# Patient Record
Sex: MALE | Race: WHITE | URBAN - METROPOLITAN AREA
[De-identification: names, ages, dates, MRNs, and addresses within clinical notes are randomized per-mention and may not be internally consistent; named-entity substitution may affect disease eponyms.]

---

## 2017-02-08 DIAGNOSIS — Z09 S/P ORTHOPEDIC SURGERY, FOLLOW-UP EXAM: Primary | ICD-10-CM

## 2017-02-16 ENCOUNTER — OFFICE VISIT (OUTPATIENT)
Dept: ORTHOPEDICS | Facility: CLINIC | Age: 22
End: 2017-02-16

## 2017-02-16 DIAGNOSIS — M89.8X6 PAIN IN LEFT TIBIA: Primary | ICD-10-CM

## 2017-02-16 NOTE — MR AVS SNAPSHOT
After Visit Summary   2017    Manuel Valdovinos    MRN: [id number]           Patient Information     Date Of Birth          1995        Visit Information        Provider Department      2017 10:15 AM Jigna Gorman, APRN CNP [facility]        Today's Diagnoses     Pain in left tibia    -  1       Follow-ups after your visit        Who to contact     Please call your clinic at [phone] to:    Ask questions about your health    Make or cancel appointments    Discuss your medicines    Learn about your test results    Speak to your doctor   If you have compliments or concerns about an experience at your clinic, or if you wish to file a complaint, please contact [facility] Patient Relations at [phone] or email us at [email or web address]         Additional Information About Your Visit        MyChart Information     ZenCard is an electronic gateway that provides easy, online access to your medical records. With ZenCard, you can request a clinic appointment, read your test results, renew a prescription or communicate with your care team.     To sign up for [email or web address] visit the website at [email or web address]   You will be asked to enter the access code listed below, as well as some personal information. Please follow the directions to create your username and password.     Your access code is: [email or web address]  Expires: 2017  6:30 AM     Your access code will  in 90 days. If you need help or a new code, please contact your [facility] or call [phone] for assistance.        Care EveryWhere ID     This is your Care EveryWhere ID. This could be used by other organizations to access your [facility] medical records  [id number]         Blood Pressure from Last 3 Encounters:   16 130/69    Weight from Last 3 Encounters:   16 103.7 kg (228 lb 9.9 oz)   10/23/15 102.1 kg (225 lb)   06/23/15  98.5 kg (217 lb 3.2 oz)              Today, you had the following     No orders found for display       Primary Care Provider Office Phone # Fax #    Kody Hunt [phone]       [facility] [address] [facility] [address]        Thank you!     Thank you for choosing [facility]  for your care. Our goal is always to provide you with excellent care. Hearing back from our patients is one way we can continue to improve our services. Please take a few minutes to complete the written survey that you may receive in the mail after your visit with us. Thank you!             Your Updated Medication List - Protect others around you: Learn how to safely use, store and throw away your medicines at www.disposemymeds.org.          This list is accurate as of: 2/16/17 11:59 PM.  Always use your most recent med list.                   Brand Name Dispense Instructions for use    acetaminophen 325 MG tablet    TYLENOL    100 tablet    Take 3 tablets (975 mg) by mouth every 8 hours       Multi-vitamin Tabs tablet      Take 1 tablet by mouth daily

## 2017-02-16 NOTE — PROGRESS NOTES
"HISTORY OF PRESENT ILLNESS:  Manuel is seen 13 months  of his left tibia osteotomy with repositioning external rotation osteotomy with intramedullary rodding.  Date of surgery 01/14/2016.  He reports that he continues to do well except for with long distance running.  He reports anterior knee pain and occasional pain at the osteotomy site with a running.  Denies locking, catching or swelling of the knee joint, just anterior pain.  This does resolve with occasional over-the-counter NSAIDs.  Otherwise, he continues to be pleased with the position and reports that this surgery \"was a huge success\".  He denies no injuries, falls, twists or trauma.  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  He denies pain at night.  No fevers or chills.      PAST MEDICAL HISTORY:  Reviewed.      REVIEW OF SYSTEMS:  Ten-point review of systems reviewed.      PHYSICAL EXAMINATION:  This is a pleasant, cooperative male, alert and oriented x3, pleasant with the exam neutral alignment upon exam.  Tenderness over the anterior knee.  Motor exam is intact.  Gait pattern is excellent.  He has no joint swelling or pain around his knee joint.  He is nontender to palpate the medial and lateral compartments with full range of motion.  Incisions are healed, benign and intact.  He is nontender to palpate the tibial osteotomy site.      IMAGING:  Two-view x-rays were taken which shows abundant healing through the osteotomy site with good position of the intramedullary patito compared to previous x-rays.      DIAGNOSIS:  Healed left tibia osteotomy with intramedullary patito, status post external rotation deformity correction.      PLAN:  At this time, the natural progression and plan of care was discussed.  I did again continue to recommend hardware removal when he is ready to do so.  Postoperative expectations were discussed.  I did report that this should help resolve his anterior knee pain.  If he continues to have pain at the " osteotomy site, we will give another 4-6 months of healing time.  He will go ahead and schedule a same day hardware removal with a followup visit 2-3 weeks prior for x-ray of his tibia.  Paperwork was filled out for restrictions which would include a long distance running.

## 2017-02-16 NOTE — LETTER
"2/16/2017       RE: Manuel Valdovinos  [address] [facility] [address] [address]     Dear Colleague,    Thank you for referring your patient, Manuel Valdovinos, to the [facility] at [facility]. Please see a copy of my visit note below.    HISTORY OF PRESENT ILLNESS:  Manuel is seen 13 months  of his left tibia osteotomy with repositioning external rotation osteotomy with intramedullary rodding.  Date of surgery 01/14/2016.  He reports that he continues to do well except for with long distance running.  He reports anterior knee pain and occasional pain at the osteotomy site with a running.  Denies locking, catching or swelling of the knee joint, just anterior pain.  This does resolve with occasional over-the-counter NSAIDs.  Otherwise, he continues to be pleased with the position and reports that this surgery \"was a huge success\".  He denies no injuries, falls, twists or trauma.  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  He denies pain at night.  No fevers or chills.      PAST MEDICAL HISTORY:  Reviewed.      REVIEW OF SYSTEMS:  Ten-point review of systems reviewed.      PHYSICAL EXAMINATION:  This is a pleasant, cooperative male, alert and oriented x3, pleasant with the exam neutral alignment upon exam.  Tenderness over the anterior knee.  Motor exam is intact.  Gait pattern is excellent.  He has no joint swelling or pain around his knee joint.  He is nontender to palpate the medial and lateral compartments with full range of motion.  Incisions are healed, benign and intact.  He is nontender to palpate the tibial osteotomy site.      IMAGING:  Two-view x-rays were taken which shows abundant healing through the osteotomy site with good position of the intramedullary patito compared to previous x-rays.      DIAGNOSIS:  Healed left tibia osteotomy with intramedullary patito, status post external rotation deformity correction.      PLAN:  At this time, the " natural progression and plan of care was discussed.  I did again continue to recommend hardware removal when he is ready to do so.  Postoperative expectations were discussed.  I did report that this should help resolve his anterior knee pain.  If he continues to have pain at the osteotomy site, we will give another 4-6 months of healing time.  He will go ahead and schedule a same day hardware removal with a followup visit 2-3 weeks prior for x-ray of his tibia.  Paperwork was filled out for restrictions which would include a long distance running.         Again, thank you for allowing me to participate in the care of your patient.      Sincerely,    JERALD Boles CNP